# Patient Record
Sex: MALE | Race: WHITE | Employment: FULL TIME | URBAN - METROPOLITAN AREA
[De-identification: names, ages, dates, MRNs, and addresses within clinical notes are randomized per-mention and may not be internally consistent; named-entity substitution may affect disease eponyms.]

---

## 2020-07-17 ENCOUNTER — OFFICE VISIT (OUTPATIENT)
Dept: NEUROLOGY | Age: 55
End: 2020-07-17
Payer: COMMERCIAL

## 2020-07-17 VITALS
BODY MASS INDEX: 30.64 KG/M2 | HEIGHT: 70 IN | DIASTOLIC BLOOD PRESSURE: 105 MMHG | OXYGEN SATURATION: 95 % | WEIGHT: 214 LBS | SYSTOLIC BLOOD PRESSURE: 155 MMHG | HEART RATE: 93 BPM | RESPIRATION RATE: 16 BRPM | TEMPERATURE: 98.4 F

## 2020-07-17 PROCEDURE — 99204 OFFICE O/P NEW MOD 45 MIN: CPT | Performed by: PSYCHIATRY & NEUROLOGY

## 2020-07-17 RX ORDER — ALPRAZOLAM 0.5 MG/1
0.5 TABLET ORAL 3 TIMES DAILY PRN
COMMUNITY
Start: 2019-12-13

## 2020-07-17 RX ORDER — FUROSEMIDE 20 MG/1
TABLET ORAL
COMMUNITY
Start: 2020-07-10

## 2020-07-17 RX ORDER — FOLIC ACID 1 MG/1
1 TABLET ORAL DAILY
COMMUNITY
Start: 2019-12-13

## 2020-07-17 RX ORDER — METHION/INOS/CHOL BT/B COM/LIV 110MG-86MG
CAPSULE ORAL
COMMUNITY
Start: 2020-05-23

## 2020-07-17 RX ORDER — PANTOPRAZOLE SODIUM 40 MG/1
TABLET, DELAYED RELEASE ORAL
COMMUNITY
Start: 2019-03-01

## 2020-07-17 ASSESSMENT — ENCOUNTER SYMPTOMS
SHORTNESS OF BREATH: 0
PHOTOPHOBIA: 0
VOMITING: 0
NAUSEA: 0
TROUBLE SWALLOWING: 0

## 2020-07-17 NOTE — PROGRESS NOTES
NEUROLOGY NEW PATIENT NOTE     Date: 7/17/2020  Name: Swati Flores  MRN: <[id number]>  Patient's PCP: eTe West DO     Dear, Dr. Tee West DO    REASON FOR VISIT/CHIEF COMPLAINT: Dizziness     HISTORY OF PRESENT ILLNESS: Swati Flores is a 53 y.o.  male with past medical history of Mckeon's esophagus, tobacco abuse, EtOH abuse dizziness. The patient reports that he had an episode of dizziness in June where he was angry at his family, was being worked up, was anxious, was yelling prolonged pain after which she became out of breath, and became dizzy, sat down. He also started having some chest pain for which he was seen in the hospital and a work-up was done. He has implantable loop recorder. The patient reports that he had one similar episode in March 2020. Symptoms lasted for about 10 to 15 minutes. Resolved on its own, no tongue bite, no bowel or bladder incontinence, did not lose consciousness. Symptoms were moderate in severity. Aggravates with anxiety, agitation, anger and being worked up. Relieved with rest.  No other associated symptoms  Sleep is normal  Appetite is good  Mood is stable    I have personally reviewed his medical records. PAST MEDICAL HISTORY: History reviewed. No pertinent past medical history.   PAST SURGICAL HISTORY:   Past Surgical History:   Procedure Laterality Date    ECHO COMPL W DOP COLOR FLOW  9/9/2013          FAMILY MEDICAL HISTORY:   Family History   Problem Relation Age of Onset    Heart Disease Father         heart attack     SOCIAL HISTORY:   Social History     Socioeconomic History    Marital status:      Spouse name: None    Number of children: None    Years of education: None    Highest education level: None   Occupational History    None   Social Needs    Financial resource strain: None    Food insecurity     Worry: None     Inability: None    Transportation needs     Medical: None     Non-medical: None   Tobacco Use    Smoking status: Former Smoker     Packs/day: 3.00    Smokeless tobacco: Former User     Quit date: 9/9/2005   Substance and Sexual Activity    Alcohol use: Yes     Comment: occasionally     Drug use: Never    Sexual activity: None   Lifestyle    Physical activity     Days per week: None     Minutes per session: None    Stress: None   Relationships    Social connections     Talks on phone: None     Gets together: None     Attends [employer] service: None     Active member of club or organization: None     Attends meetings of clubs or organizations: None     Relationship status: None    Intimate partner violence     Fear of current or ex partner: None     Emotionally abused: None     Physically abused: None     Forced sexual activity: None   Other Topics Concern    None   Social History Narrative    None      E-Cigarettes Vaping or Juuling     Questions Responses    Vaping Use Never User    Start Date     Does device contain nicotine? Quit Date     Vaping Type          Allergy: No Known Allergies  MEDS:   Current Outpatient Medications:     pantoprazole (PROTONIX) 40 MG tablet, TAKE ONE TABLET BY MOUTH EVERY DAY BEFORE BREAKFAST, Disp: , Rfl:     furosemide (LASIX) 20 MG tablet, TAKE ONE TABLET BY MOUTH EVERY DAY, Disp: , Rfl:     Aspirin Buf,CaCarb-MgCarb-MgO, 81 MG TABS, Take 81 mg by mouth daily, Disp: , Rfl:     Thiamine HCl (B-1) 100 MG TABS, TAKE ONE TABLET BY MOUTH EVERY DAY, Disp: , Rfl:     folic acid (FOLVITE) 1 MG tablet, Take 1 mg by mouth daily, Disp: , Rfl:     ALPRAZolam (XANAX) 0.5 MG tablet, Take 0.5 mg by mouth 3 times daily as needed. , Disp: , Rfl:     REVIEW OF SYSTEMS  Review of Systems   Constitutional: Negative for appetite change, fatigue and unexpected weight change. HENT: Negative for drooling, hearing loss, tinnitus and trouble swallowing. Eyes: Negative for photophobia and visual disturbance. Respiratory: Negative for shortness of breath.     Cardiovascular: Negative for palpitations. Gastrointestinal: Negative for nausea and vomiting. Endocrine: Negative for polyuria. Genitourinary: Negative for flank pain. Musculoskeletal: Negative for neck pain and neck stiffness. Skin: Negative for rash. Allergic/Immunologic: Negative for food allergies. Neurological: Negative for dizziness, tremors, seizures, syncope, speech difficulty, weakness, light-headedness, numbness and headaches. Hematological: Negative for adenopathy. Psychiatric/Behavioral: Negative for agitation, behavioral problems and sleep disturbance. PHYSICAL EXAM:   BP (!) 155/105   Pulse 93   Temp 98.4 °F (36.9 °C) (Oral)   Resp 16   Ht 5' 10\" (1.778 m)   Wt 214 lb (97.1 kg)   SpO2 95%   BMI 30.71 kg/m²   GENERAL APPEARANCE: Alert, well-developed, well-nourished male in no acute distress. HEENT: Normocephalic and atraumatic. PERRL. Oropharynx unremarkable. PULM: Normal respiratory effort. No accessory muscle use. CV: RRR. ABDOMEN: Soft, nontender. EXTREMITIES: No obvious signs of vascular compromise. Pulses present. No cyanosis, clubbing or edema. SKIN: Clear; no rashes, lesions or skin breaks in exposed areas. NEURO:     Neurological examination     MENTAL STATUS: Patient awake and oriented to time, place, and person. Recent/remote memory normal. Attention span/concentration normal. Speech fluent. Good comprehension, naming, and repetition. Fund of knowledge appropriate for patient's level of education. Affect normal.    CRANIAL NERVES:  CN I: Not tested. CN II: Fundoscopic exam not performed. CN III, IV, VI: Pupils equal, round and reactive to light; extra ocular movements full and intact. CN V: Facial sensation normal.  CN VII: No facial asymmetry. CN VIII:  Hearing grossly normal bilaterally. No pathologic nystagmus or skew deviation. CN IX, X: Palate elevates symmetrically. CN XI: Shoulder shrug and chin rotation equal with intact strength.   CN XII: Tongue protrusion midline. MOTOR: Normal bulk. Tone normal and symmetric throughout. Strength 5/5 throughout. ABNORMAL MOVEMENTS/TREMORS: No     REFLEXES: DTRs 2+; normal and symmetric throughout. Plantar response downgoing. SENSATION: Sensation grossly intact to fine touch, pain/temperature, vibration and position. COORDINATION: Finger-to-nose and heel to shin normal for age and symmetric. Finger tapping and alternating movements normal.    STATION: Negative Romberg. GAIT:  Normal heel, toe and tandem; no ataxia. DIAGNOSTIC TESTS:     I have personally reviewed the most recent lab results:    Sodium   Date Value Ref Range Status   08/09/2011 138 132 - 146 mmol/L Final     Potassium   Date Value Ref Range Status   08/09/2011 4.2 3.5 - 5.5 mmol/L Final     Chloride   Date Value Ref Range Status   08/09/2011 105 99 - 109 mmol/L Final     CO2   Date Value Ref Range Status   08/09/2011 29 20 - 31 mmol/L Final     BUN   Date Value Ref Range Status   08/09/2011 9 6 - 20 mg/dL Final     Creatinine   Date Value Ref Range Status   08/09/2011 0.9 0.7 - 1.3 mg/dL Final     Gfr Calculated   Date Value Ref Range Status   08/09/2011 >60 >=60 ml/mn/1.73 Final     Comment:     Chronic Kidney Disease- less than 60 ml/min/1.73 sq.m. Kidney Failure- less than 15 ml/min/1.73 sq.m.      Calcium   Date Value Ref Range Status   08/09/2011 9.5 8.6 - 10.5 mg/dL Final     WBC   Date Value Ref Range Status   08/09/2011 5.9 4.5 - 11.5 E9/L Final     HGB   Date Value Ref Range Status   08/09/2011 13.7 12.5 - 16.5 g/dL Final     HCT   Date Value Ref Range Status   08/09/2011 39.6 37.0 - 54.0 % Final     Platelets   Date Value Ref Range Status   08/09/2011 246 130 - 450 E9/L Final     Monocytes   Date Value Ref Range Status   08/09/2011 9 2 - 12 % Final     Total Protein   Date Value Ref Range Status   08/09/2011 7.4 5.7 - 8.2 g/dL Final     Bilirubin, Total   Date Value Ref Range Status   08/09/2011 0.4 0.3 - 1.2 mg/dL Final     Alkaline Phosphatase   Date Value Ref Range Status   08/09/2011 48 45 - 129 U/L Final     ALT   Date Value Ref Range Status   08/09/2011 37 10 - 49 U/L Final     AST   Date Value Ref Range Status   08/09/2011 28 0 - 33 U/L Final     Lab Results   Component Value Date    INR 1.4 08/09/2011     No results found for: CHOLTOT, TRIG, HDL  No components found for: HGBA1C  No results found for: PROTEINCSF, GLUCCSF, WBCCSF    Controlled Substance Monitoring:    Acute and Chronic Pain Monitoring:   No flowsheet data found. MEDICAL DECISION MAKING  ASSESSMENT/PLAN    Ary Rutherford was seen today for dizziness. Diagnoses and all orders for this visit:    Syncope, unspecified syncope type    Dizziness      -     CT Head WO Contrast; Future  -     EEG awake or drowsy routine; Future    · Patient has been referred for neurological evaluation as he is a   · Check CT head to look for any acute intracranial normality. · EEG awake and drowsy state. · Etiology: I suspect dizziness secondary to underlying hyperventilation and anxiety playing a role in this. Patient has been cleared by cardiology. · Patient was counseled on smoking and EtOH cessation. Return if symptoms worsen or fail to improve. Thank you for involving me in the care of your patient. Today, I personally spent about 60 minutes directly face-to-face time with the patient, of which greater than 50% was spent in patient education, counseling,about etiology management and diagnosis of dizziness, anxiety, hyperventilation, seizures, seizure prevention strategies. Side effects of medications were discussed in detail with the patient, verbalizes understanding and agrees to it. And coordination of care as described above. Patient's current medication list, allergies, problem list and results of all previously ordered testing and scans were reviewed at today's visit.       Claudia Begum MD    [facility]  [address] [place] [address] [place], [place]

## 2020-07-20 ENCOUNTER — HOSPITAL ENCOUNTER (OUTPATIENT)
Dept: CT IMAGING | Age: 55
Discharge: HOME | End: 2020-07-22
Payer: COMMERCIAL

## 2020-07-20 ENCOUNTER — TELEPHONE (OUTPATIENT)
Dept: NEUROLOGY | Age: 55
End: 2020-07-20

## 2020-07-20 ENCOUNTER — HOSPITAL ENCOUNTER (OUTPATIENT)
Dept: NEUROLOGY | Age: 55
Discharge: HOME | End: 2020-07-20
Payer: COMMERCIAL

## 2020-07-20 PROCEDURE — 95816 EEG AWAKE AND DROWSY: CPT

## 2020-07-20 PROCEDURE — 95819 EEG AWAKE AND ASLEEP: CPT | Performed by: PSYCHIATRY & NEUROLOGY

## 2020-07-20 NOTE — PROCEDURES
electrographic or electroclinical seizures are recorded. CLINICAL INTERPRETATION:  This is a normal EEG recorded during wakefulness, drowsiness, and sleep.      Jing Chinchilla MD  Neurology & Clinical Neurophysiology    [facility]  [address] [facility],  [address] [place] [address], [id number]  Office phone: [phone]

## 2020-07-22 ENCOUNTER — TELEPHONE (OUTPATIENT)
Dept: NEUROLOGY | Age: 55
End: 2020-07-22

## 2020-07-22 NOTE — LETTER
[facility]  [address] [address] [address]  Phone: [phone]  Fax: [phone]    Akira Duncan MD        July 22, 2020     Patient: Massiel Preston   YOB: 1965   Date of Visit: 7/22/2020       To Whom It May Concern: It is my medical opinion that Andrez Cannon had a complete neurological evaluation in the clinic and may return to work with no restrictions. If you have any questions or concerns, please don't hesitate to call.     Sincerely,        Akira Duncan MD

## 2020-07-22 NOTE — TELEPHONE ENCOUNTER
Spoke with patient and informed him that his EEG is normal. Patient is requesting Dr. Piotr Low to write a statement stating there are no neurological deficients and he is okay to work. Please advise.

## 2021-06-12 ENCOUNTER — APPOINTMENT (OUTPATIENT)
Dept: CT IMAGING | Age: 56
End: 2021-06-12
Payer: COMMERCIAL

## 2021-06-12 ENCOUNTER — HOSPITAL ENCOUNTER (EMERGENCY)
Age: 56
Discharge: HOME | End: 2021-06-12
Attending: EMERGENCY MEDICINE
Payer: COMMERCIAL

## 2021-06-12 ENCOUNTER — APPOINTMENT (OUTPATIENT)
Dept: GENERAL RADIOLOGY | Age: 56
End: 2021-06-12
Payer: COMMERCIAL

## 2021-06-12 VITALS
DIASTOLIC BLOOD PRESSURE: 78 MMHG | SYSTOLIC BLOOD PRESSURE: 139 MMHG | HEIGHT: 70 IN | WEIGHT: 215 LBS | TEMPERATURE: 98.6 F | RESPIRATION RATE: 14 BRPM | HEART RATE: 78 BPM | OXYGEN SATURATION: 98 % | BODY MASS INDEX: 30.78 KG/M2

## 2021-06-12 DIAGNOSIS — F10.920 ACUTE ALCOHOLIC INTOXICATION WITHOUT COMPLICATION (HCC): ICD-10-CM

## 2021-06-12 DIAGNOSIS — S09.90XA INJURY OF HEAD, INITIAL ENCOUNTER: Primary | ICD-10-CM

## 2021-06-12 LAB
ACETAMINOPHEN LEVEL: <5 MCG/ML (ref 10–30)
ALBUMIN SERPL-MCNC: 4.4 G/DL (ref 3.5–5.2)
ALP BLD-CCNC: 57 U/L (ref 40–129)
ALT SERPL-CCNC: 50 U/L (ref 0–40)
AMPHETAMINE SCREEN, URINE: NOT DETECTED
ANION GAP SERPL CALCULATED.3IONS-SCNC: 14 MMOL/L (ref 7–16)
AST SERPL-CCNC: 31 U/L (ref 0–39)
BARBITURATE SCREEN URINE: NOT DETECTED
BASOPHILS ABSOLUTE: 0.09 E9/L (ref 0–0.2)
BASOPHILS RELATIVE PERCENT: 1.1 % (ref 0–2)
BENZODIAZEPINE SCREEN, URINE: NOT DETECTED
BILIRUB SERPL-MCNC: 0.4 MG/DL (ref 0–1.2)
BUN BLDV-MCNC: 13 MG/DL (ref 6–20)
CALCIUM SERPL-MCNC: 9.3 MG/DL (ref 8.6–10.2)
CANNABINOID SCREEN URINE: NOT DETECTED
CHLORIDE BLD-SCNC: 104 MMOL/L (ref 98–107)
CO2: 23 MMOL/L (ref 22–29)
COCAINE METABOLITE SCREEN URINE: NOT DETECTED
CREAT SERPL-MCNC: 1 MG/DL (ref 0.7–1.2)
EKG ATRIAL RATE: 72 BPM
EKG P AXIS: 18 DEGREES
EKG P-R INTERVAL: 150 MS
EKG Q-T INTERVAL: 422 MS
EKG QRS DURATION: 100 MS
EKG QTC CALCULATION (BAZETT): 462 MS
EKG R AXIS: 9 DEGREES
EKG T AXIS: 17 DEGREES
EKG VENTRICULAR RATE: 72 BPM
EOSINOPHILS ABSOLUTE: 0.43 E9/L (ref 0.05–0.5)
EOSINOPHILS RELATIVE PERCENT: 5.2 % (ref 0–6)
ETHANOL: 91 MG/DL (ref 0–0.08)
FENTANYL SCREEN, URINE: NOT DETECTED
GFR AFRICAN AMERICAN: >60
GFR NON-AFRICAN AMERICAN: >60 ML/MIN/1.73
GLUCOSE BLD-MCNC: 102 MG/DL (ref 74–99)
HCT VFR BLD CALC: 39.1 % (ref 37–54)
HEMOGLOBIN: 13.4 G/DL (ref 12.5–16.5)
IMMATURE GRANULOCYTES #: 0.13 E9/L
IMMATURE GRANULOCYTES %: 1.6 % (ref 0–5)
LYMPHOCYTES ABSOLUTE: 2.69 E9/L (ref 1.5–4)
LYMPHOCYTES RELATIVE PERCENT: 32.8 % (ref 20–42)
Lab: NORMAL
MAGNESIUM: 2.3 MG/DL (ref 1.6–2.6)
MCH RBC QN AUTO: 29.4 PG (ref 26–35)
MCHC RBC AUTO-ENTMCNC: 34.3 % (ref 32–34.5)
MCV RBC AUTO: 85.7 FL (ref 80–99.9)
METHADONE SCREEN, URINE: NOT DETECTED
MONOCYTES ABSOLUTE: 0.54 E9/L (ref 0.1–0.95)
MONOCYTES RELATIVE PERCENT: 6.6 % (ref 2–12)
NEUTROPHILS ABSOLUTE: 4.33 E9/L (ref 1.8–7.3)
NEUTROPHILS RELATIVE PERCENT: 52.7 % (ref 43–80)
OPIATE SCREEN URINE: NOT DETECTED
OXYCODONE URINE: NOT DETECTED
PDW BLD-RTO: 12.4 FL (ref 11.5–15)
PHENCYCLIDINE SCREEN URINE: NOT DETECTED
PLATELET # BLD: 285 E9/L (ref 130–450)
PMV BLD AUTO: 10.1 FL (ref 7–12)
POTASSIUM REFLEX MAGNESIUM: 3.4 MMOL/L (ref 3.5–5)
RBC # BLD: 4.56 E12/L (ref 3.8–5.8)
SALICYLATE, SERUM: <0.3 MG/DL (ref 0–30)
SODIUM BLD-SCNC: 141 MMOL/L (ref 132–146)
TOTAL PROTEIN: 7.1 G/DL (ref 6.4–8.3)
TRICYCLIC ANTIDEPRESSANTS SCREEN SERUM: NEGATIVE NG/ML
TROPONIN, HIGH SENSITIVITY: <6 NG/L (ref 0–11)
WBC # BLD: 8.2 E9/L (ref 4.5–11.5)

## 2021-06-12 PROCEDURE — 93005 ELECTROCARDIOGRAM TRACING: CPT | Performed by: STUDENT IN AN ORGANIZED HEALTH CARE EDUCATION/TRAINING PROGRAM

## 2021-06-12 PROCEDURE — 80307 DRUG TEST PRSMV CHEM ANLYZR: CPT

## 2021-06-12 PROCEDURE — 71045 X-RAY EXAM CHEST 1 VIEW: CPT

## 2021-06-12 PROCEDURE — 72125 CT NECK SPINE W/O DYE: CPT

## 2021-06-12 PROCEDURE — 99284 EMERGENCY DEPT VISIT MOD MDM: CPT

## 2021-06-12 PROCEDURE — 80179 DRUG ASSAY SALICYLATE: CPT

## 2021-06-12 PROCEDURE — 85025 COMPLETE CBC W/AUTO DIFF WBC: CPT

## 2021-06-12 PROCEDURE — 71275 CT ANGIOGRAPHY CHEST: CPT

## 2021-06-12 PROCEDURE — 82077 ASSAY SPEC XCP UR&BREATH IA: CPT

## 2021-06-12 PROCEDURE — 6360000004 HC RX CONTRAST MEDICATION: Performed by: STUDENT IN AN ORGANIZED HEALTH CARE EDUCATION/TRAINING PROGRAM

## 2021-06-12 PROCEDURE — 93010 ELECTROCARDIOGRAM REPORT: CPT | Performed by: INTERNAL MEDICINE

## 2021-06-12 PROCEDURE — 80143 DRUG ASSAY ACETAMINOPHEN: CPT

## 2021-06-12 PROCEDURE — 70450 CT HEAD/BRAIN W/O DYE: CPT

## 2021-06-12 PROCEDURE — 84484 ASSAY OF TROPONIN QUANT: CPT

## 2021-06-12 PROCEDURE — 80053 COMPREHEN METABOLIC PANEL: CPT

## 2021-06-12 PROCEDURE — 83735 ASSAY OF MAGNESIUM: CPT

## 2021-06-12 RX ADMIN — IOPAMIDOL 75 ML: 755 INJECTION, SOLUTION INTRAVENOUS at 06:41

## 2021-06-12 NOTE — ED NOTES
Message left with [place] to see if she would be available to  her father from the ER.      Maxwell Forrest RN  06/12/21 9629

## 2021-06-12 NOTE — ED PROVIDER NOTES
HPI:  6/12/21, Time: 12:19 AM EDT         Trygve Collet is a 63 y.o. male presenting to the ED for history of fall, beginning short time ago. The complaint has been persistent, moderate in severity, and worsened by nothing. Patient presenting here because of fall. Patient was confused at home. Patient unable to complete history was found in the garage by air compressor. Patient has history of seizures he also had been drinking. Patient has no reported chest pain or vomiting or there is no history of diarrhea. Patient unable to tell me exactly what happened at home. ROS:   Pertinent positives and negatives are stated within HPI, all other systems reviewed and are negative.  --------------------------------------------- PAST HISTORY ---------------------------------------------  Past Medical History:  has no past medical history on file. Past Surgical History:  has a past surgical history that includes ECHO Compl W Dop Color Flow (9/9/2013). Social History:  reports that he has quit smoking. He smoked 3.00 packs per day. He quit smokeless tobacco use about 15 years ago. He reports current alcohol use. He reports that he does not use drugs. Family History: family history includes Heart Disease in his father. The patients home medications have been reviewed. Allergies: Patient has no known allergies. ---------------------------------------------------PHYSICAL EXAM--------------------------------------    Constitutional/General: Alert and oriented x3, confused  Head: Normocephalic and atraumatic  Eyes: PERRL, EOMI  Mouth: Oropharynx clear, handling secretions, no trismus  Neck: c collar   Pulmonary: Lungs clear to auscultation bilaterally, no wheezes, rales, or rhonchi. Not in respiratory distress  Cardiovascular:  Regular rate. Regular rhythm. No murmurs, gallops, or rubs. 2+ distal pulses  Chest: no chest wall tenderness  Abdomen: Soft. Non tender. Non distended. +BS.   No rebound, guarding, or rigidity. No pulsatile masses appreciated. Musculoskeletal: Moves all extremities x 4. Warm and well perfused, no clubbing, cyanosis, or edema. Capillary refill <3 seconds  Skin: warm and dry. No rashes. Neurologic: GCS 15, CN 2-12 grossly intact, no focal deficits, symmetric strength 5/5 in the upper and lower extremities bilaterally  Psych: Normal Affect    -------------------------------------------------- RESULTS -------------------------------------------------  I have personally reviewed all laboratory and imaging results for this patient. Results are listed below.      LABS:  Results for orders placed or performed during the hospital encounter of 06/12/21   CBC Auto Differential   Result Value Ref Range    WBC 8.2 4.5 - 11.5 E9/L    RBC 4.56 3.80 - 5.80 E12/L    Hemoglobin 13.4 12.5 - 16.5 g/dL    Hematocrit 39.1 37.0 - 54.0 %    MCV 85.7 80.0 - 99.9 fL    MCH 29.4 26.0 - 35.0 pg    MCHC 34.3 32.0 - 34.5 %    RDW 12.4 11.5 - 15.0 fL    Platelets 154 731 - 618 E9/L    MPV 10.1 7.0 - 12.0 fL    Neutrophils % 52.7 43.0 - 80.0 %    Immature Granulocytes % 1.6 0.0 - 5.0 %    Lymphocytes % 32.8 20.0 - 42.0 %    Monocytes % 6.6 2.0 - 12.0 %    Eosinophils % 5.2 0.0 - 6.0 %    Basophils % 1.1 0.0 - 2.0 %    Neutrophils Absolute 4.33 1.80 - 7.30 E9/L    Immature Granulocytes # 0.13 E9/L    Lymphocytes Absolute 2.69 1.50 - 4.00 E9/L    Monocytes Absolute 0.54 0.10 - 0.95 E9/L    Eosinophils Absolute 0.43 0.05 - 0.50 E9/L    Basophils Absolute 0.09 0.00 - 0.20 E9/L   Comprehensive Metabolic Panel w/ Reflex to MG   Result Value Ref Range    Sodium 141 132 - 146 mmol/L    Potassium reflex Magnesium 3.4 (L) 3.5 - 5.0 mmol/L    Chloride 104 98 - 107 mmol/L    CO2 23 22 - 29 mmol/L    Anion Gap 14 7 - 16 mmol/L    Glucose 102 (H) 74 - 99 mg/dL    BUN 13 6 - 20 mg/dL    CREATININE 1.0 0.7 - 1.2 mg/dL    GFR Non-African American >60 >=60 mL/min/1.73    GFR African American >60     Calcium 9.3 8.6 - 10.2 mg/dL Total Protein 7.1 6.4 - 8.3 g/dL    Albumin 4.4 3.5 - 5.2 g/dL    Total Bilirubin 0.4 0.0 - 1.2 mg/dL    Alkaline Phosphatase 57 40 - 129 U/L    ALT 50 (H) 0 - 40 U/L    AST 31 0 - 39 U/L   Troponin   Result Value Ref Range    Troponin, High Sensitivity <6 0 - 11 ng/L   URINE DRUG SCREEN   Result Value Ref Range    Amphetamine Screen, Urine NOT DETECTED Negative <1000 ng/mL    Barbiturate Screen, Ur NOT DETECTED Negative < 200 ng/mL    Benzodiazepine Screen, Urine NOT DETECTED Negative < 200 ng/mL    Cannabinoid Scrn, Ur NOT DETECTED Negative < 50ng/mL    Cocaine Metabolite Screen, Urine NOT DETECTED Negative < 300 ng/mL    Opiate Scrn, Ur NOT DETECTED Negative < 300ng/mL    PCP Screen, Urine NOT DETECTED Negative < 25 ng/mL    Methadone Screen, Urine NOT DETECTED Negative <300 ng/mL    Oxycodone Urine NOT DETECTED Negative <100 ng/mL    FENTANYL SCREEN, URINE NOT DETECTED Negative <1 ng/mL    Drug Screen Comment: see below    Magnesium   Result Value Ref Range    Magnesium 2.3 1.6 - 2.6 mg/dL   Serum Drug Screen   Result Value Ref Range    Ethanol Lvl 91 mg/dL    Acetaminophen Level <5.0 (L) 10.0 - 55.3 mcg/mL    Salicylate, Serum <6.0 0.0 - 30.0 mg/dL    TCA Scrn NEGATIVE Cutoff:300 ng/mL   EKG 12 Lead   Result Value Ref Range    Ventricular Rate 72 BPM    Atrial Rate 72 BPM    P-R Interval 150 ms    QRS Duration 100 ms    Q-T Interval 422 ms    QTc Calculation (Bazett) 462 ms    P Axis 18 degrees    R Axis 9 degrees    T Axis 17 degrees       RADIOLOGY:  Interpreted by Radiologist.  CT Head WO Contrast   Final Result   No acute intracranial abnormality. Chronic appearing findings. MRI would be   useful if symptoms persist.         CT Cervical Spine WO Contrast   Final Result   No acute abnormality of the cervical spine. Degenerative changes. MRI would   be useful if symptoms persist.         CTA CHEST W CONTRAST   Final Result   No pulmonary embolism. Dependent atelectasis in the lung bases.       Minimal scarring or atelectasis in the lingula. Mild emphysematous changes. Moderate hiatal hernia. Findings are suggestive of asymmetric gynecomastia towards the left. Neoplastic process thought less likely. Correlate with clinical exam   findings. If indicated, this could be assessed with mammogram and or   ultrasound. XR CHEST PORTABLE   Final Result   Diminished lung volume with patchy left basilar opacities which could   represent atelectasis or developing infiltrates from pneumonia. PA and   lateral views would be useful for further assessment, if symptoms persist.             EKG:  This EKG is signed and interpreted by me. Rate: 72  Rhythm: Sinus  Interpretation: no acute changes  Comparison: no previous EKG available      ------------------------- NURSING NOTES AND VITALS REVIEWED ---------------------------   The nursing notes within the ED encounter and vital signs as below have been reviewed by myself. /78   Pulse 78   Temp 98.6 °F (37 °C)   Resp 14   Ht 5' 10\" (1.778 m)   Wt 215 lb (97.5 kg)   SpO2 98%   BMI 30.85 kg/m²   Oxygen Saturation Interpretation: Normal    The patients available past medical records and past encounters were reviewed. ------------------------------ ED COURSE/MEDICAL DECISION MAKING----------------------  Medications   iopamidol (ISOVUE-370) 76 % injection 75 mL (75 mLs Intravenous Given 6/12/21 0641)             Medical Decision Making:    Patient presented here because of fall. Patient unknown as to whether he did seizure. Patient had been drinking. Patient reporting no planes of chest pain or vomiting he reports no headache he reports no numbness or tingling labs noted reviewed EKG noted    Re-Evaluations:             Re-evaluation. Patients symptoms show no change  Patient reevaluated vital signs stable. Patient in no acute distress moving all extremities no numbness no tingling.     Consultations:                 Critical Care:         This patient's ED course included: a personal history and physicial eaxmination    This patient has been closely monitored during their ED course. Counseling: The emergency provider has spoken with the patient and discussed todays results, in addition to providing specific details for the plan of care and counseling regarding the diagnosis and prognosis. Questions are answered at this time and they are agreeable with the plan.       --------------------------------- IMPRESSION AND DISPOSITION ---------------------------------    IMPRESSION  1. Injury of head, initial encounter    2. Acute alcoholic intoxication without complication ([facility] [address]        DISPOSITION  Disposition: Disposition is dependent on CT findings  Patient condition is stable        NOTE: This report was transcribed using voice recognition software.  Every effort was made to ensure accuracy; however, inadvertent computerized transcription errors may be present          Live Mitchell MD  06/12/21 [address] [place] [address],[address] Parisa Hanley MD  06/12/21 2010

## 2021-06-12 NOTE — ED NOTES
Pt unable to contact of his wife at this time for a ride home. This RN also attempted to call wife but there was no answer and phone was turned off.       Nghia Walton RN  06/12/21 2968

## 2022-08-01 ENCOUNTER — APPOINTMENT (OUTPATIENT)
Dept: CT IMAGING | Age: 57
DRG: 897 | End: 2022-08-01
Payer: COMMERCIAL

## 2022-08-01 ENCOUNTER — APPOINTMENT (OUTPATIENT)
Dept: GENERAL RADIOLOGY | Age: 57
DRG: 897 | End: 2022-08-01
Payer: COMMERCIAL

## 2022-08-01 ENCOUNTER — HOSPITAL ENCOUNTER (INPATIENT)
Age: 57
LOS: 1 days | Discharge: HOME | DRG: 897 | End: 2022-08-02
Attending: EMERGENCY MEDICINE | Admitting: INTERNAL MEDICINE
Payer: COMMERCIAL

## 2022-08-01 DIAGNOSIS — R29.90 STROKE-LIKE SYMPTOMS: ICD-10-CM

## 2022-08-01 DIAGNOSIS — F10.920 ACUTE ALCOHOLIC INTOXICATION WITHOUT COMPLICATION (HCC): ICD-10-CM

## 2022-08-01 DIAGNOSIS — R55 SYNCOPE AND COLLAPSE: Primary | ICD-10-CM

## 2022-08-01 DIAGNOSIS — S09.90XA INJURY OF HEAD, INITIAL ENCOUNTER: ICD-10-CM

## 2022-08-01 LAB
ACETAMINOPHEN LEVEL: <5 MCG/ML (ref 10–30)
ANION GAP SERPL CALCULATED.3IONS-SCNC: 17 MMOL/L (ref 7–16)
APTT: 33.4 SEC (ref 24.5–35.1)
BASOPHILS ABSOLUTE: 0.04 E9/L (ref 0–0.2)
BASOPHILS RELATIVE PERCENT: 0.6 % (ref 0–2)
BUN BLDV-MCNC: 10 MG/DL (ref 6–20)
CALCIUM SERPL-MCNC: 8.7 MG/DL (ref 8.6–10.2)
CHLORIDE BLD-SCNC: 103 MMOL/L (ref 98–107)
CO2: 20 MMOL/L (ref 22–29)
CREAT SERPL-MCNC: 0.8 MG/DL (ref 0.7–1.2)
EOSINOPHILS ABSOLUTE: 0.26 E9/L (ref 0.05–0.5)
EOSINOPHILS RELATIVE PERCENT: 3.9 % (ref 0–6)
ETHANOL: 224 MG/DL (ref 0–0.08)
GFR AFRICAN AMERICAN: >60
GFR NON-AFRICAN AMERICAN: >60 ML/MIN/1.73
GLUCOSE BLD-MCNC: 87 MG/DL (ref 74–99)
HCT VFR BLD CALC: 34.3 % (ref 37–54)
HEMOGLOBIN: 11.7 G/DL (ref 12.5–16.5)
IMMATURE GRANULOCYTES #: 0.05 E9/L
IMMATURE GRANULOCYTES %: 0.7 % (ref 0–5)
INR BLD: 1.3
LYMPHOCYTES ABSOLUTE: 2.17 E9/L (ref 1.5–4)
LYMPHOCYTES RELATIVE PERCENT: 32.1 % (ref 20–42)
MCH RBC QN AUTO: 29.3 PG (ref 26–35)
MCHC RBC AUTO-ENTMCNC: 34.1 % (ref 32–34.5)
MCV RBC AUTO: 86 FL (ref 80–99.9)
MONOCYTES ABSOLUTE: 0.42 E9/L (ref 0.1–0.95)
MONOCYTES RELATIVE PERCENT: 6.2 % (ref 2–12)
NEUTROPHILS ABSOLUTE: 3.81 E9/L (ref 1.8–7.3)
NEUTROPHILS RELATIVE PERCENT: 56.5 % (ref 43–80)
PDW BLD-RTO: 13 FL (ref 11.5–15)
PLATELET # BLD: 191 E9/L (ref 130–450)
PMV BLD AUTO: 9.7 FL (ref 7–12)
POTASSIUM REFLEX MAGNESIUM: 3.6 MMOL/L (ref 3.5–5)
PROTHROMBIN TIME: 14.1 SEC (ref 9.3–12.4)
RBC # BLD: 3.99 E12/L (ref 3.8–5.8)
SALICYLATE, SERUM: <0.3 MG/DL (ref 0–30)
SODIUM BLD-SCNC: 140 MMOL/L (ref 132–146)
TRICYCLIC ANTIDEPRESSANTS SCREEN SERUM: NEGATIVE NG/ML
TROPONIN, HIGH SENSITIVITY: 7 NG/L (ref 0–11)
WBC # BLD: 6.8 E9/L (ref 4.5–11.5)

## 2022-08-01 PROCEDURE — 82077 ASSAY SPEC XCP UR&BREATH IA: CPT

## 2022-08-01 PROCEDURE — 80307 DRUG TEST PRSMV CHEM ANLYZR: CPT

## 2022-08-01 PROCEDURE — 2060000000 HC ICU INTERMEDIATE R&B

## 2022-08-01 PROCEDURE — 74177 CT ABD & PELVIS W/CONTRAST: CPT

## 2022-08-01 PROCEDURE — 85730 THROMBOPLASTIN TIME PARTIAL: CPT

## 2022-08-01 PROCEDURE — 80048 BASIC METABOLIC PNL TOTAL CA: CPT

## 2022-08-01 PROCEDURE — 87088 URINE BACTERIA CULTURE: CPT

## 2022-08-01 PROCEDURE — 72125 CT NECK SPINE W/O DYE: CPT

## 2022-08-01 PROCEDURE — 6360000004 HC RX CONTRAST MEDICATION: Performed by: RADIOLOGY

## 2022-08-01 PROCEDURE — 81001 URINALYSIS AUTO W/SCOPE: CPT

## 2022-08-01 PROCEDURE — 93005 ELECTROCARDIOGRAM TRACING: CPT | Performed by: EMERGENCY MEDICINE

## 2022-08-01 PROCEDURE — 71045 X-RAY EXAM CHEST 1 VIEW: CPT

## 2022-08-01 PROCEDURE — 80179 DRUG ASSAY SALICYLATE: CPT

## 2022-08-01 PROCEDURE — 80143 DRUG ASSAY ACETAMINOPHEN: CPT

## 2022-08-01 PROCEDURE — 85610 PROTHROMBIN TIME: CPT

## 2022-08-01 PROCEDURE — 84484 ASSAY OF TROPONIN QUANT: CPT

## 2022-08-01 PROCEDURE — 36415 COLL VENOUS BLD VENIPUNCTURE: CPT

## 2022-08-01 PROCEDURE — 99285 EMERGENCY DEPT VISIT HI MDM: CPT

## 2022-08-01 PROCEDURE — 71260 CT THORAX DX C+: CPT

## 2022-08-01 PROCEDURE — 70450 CT HEAD/BRAIN W/O DYE: CPT

## 2022-08-01 PROCEDURE — 85025 COMPLETE CBC W/AUTO DIFF WBC: CPT

## 2022-08-01 RX ORDER — ASPIRIN 81 MG/1
324 TABLET, CHEWABLE ORAL ONCE
Status: COMPLETED | OUTPATIENT
Start: 2022-08-01 | End: 2022-08-02

## 2022-08-01 RX ADMIN — IOPAMIDOL 75 ML: 755 INJECTION, SOLUTION INTRAVENOUS at 21:51

## 2022-08-01 ASSESSMENT — PAIN - FUNCTIONAL ASSESSMENT: PAIN_FUNCTIONAL_ASSESSMENT: NONE - DENIES PAIN

## 2022-08-01 NOTE — Clinical Note
Discharge Plan[de-identified] Other/[facility])   Telemetry/Cardiac Monitoring Required?: Yes

## 2022-08-02 ENCOUNTER — APPOINTMENT (OUTPATIENT)
Dept: MRI IMAGING | Age: 57
DRG: 897 | End: 2022-08-02
Payer: COMMERCIAL

## 2022-08-02 ENCOUNTER — APPOINTMENT (OUTPATIENT)
Dept: ULTRASOUND IMAGING | Age: 57
DRG: 897 | End: 2022-08-02
Payer: COMMERCIAL

## 2022-08-02 VITALS
DIASTOLIC BLOOD PRESSURE: 92 MMHG | WEIGHT: 250 LBS | RESPIRATION RATE: 18 BRPM | OXYGEN SATURATION: 97 % | HEART RATE: 77 BPM | HEIGHT: 70 IN | BODY MASS INDEX: 35.79 KG/M2 | SYSTOLIC BLOOD PRESSURE: 136 MMHG | TEMPERATURE: 97.5 F

## 2022-08-02 PROBLEM — R55 SYNCOPE: Status: ACTIVE | Noted: 2022-08-02

## 2022-08-02 LAB
ALBUMIN SERPL-MCNC: 4.3 G/DL (ref 3.5–5.2)
ALP BLD-CCNC: 57 U/L (ref 40–129)
ALT SERPL-CCNC: 39 U/L (ref 0–40)
AMPHETAMINE SCREEN, URINE: NOT DETECTED
ANION GAP SERPL CALCULATED.3IONS-SCNC: 13 MMOL/L (ref 7–16)
AST SERPL-CCNC: 36 U/L (ref 0–39)
BACTERIA: NORMAL /HPF
BARBITURATE SCREEN URINE: NOT DETECTED
BASOPHILS ABSOLUTE: 0.03 E9/L (ref 0–0.2)
BASOPHILS RELATIVE PERCENT: 0.7 % (ref 0–2)
BENZODIAZEPINE SCREEN, URINE: NOT DETECTED
BILIRUB SERPL-MCNC: 0.3 MG/DL (ref 0–1.2)
BILIRUBIN URINE: NEGATIVE
BLOOD, URINE: NEGATIVE
BUN BLDV-MCNC: 11 MG/DL (ref 6–20)
CALCIUM SERPL-MCNC: 8.9 MG/DL (ref 8.6–10.2)
CANNABINOID SCREEN URINE: NOT DETECTED
CHLORIDE BLD-SCNC: 109 MMOL/L (ref 98–107)
CLARITY: CLEAR
CO2: 22 MMOL/L (ref 22–29)
COCAINE METABOLITE SCREEN URINE: NOT DETECTED
COLOR: YELLOW
CREAT SERPL-MCNC: 0.8 MG/DL (ref 0.7–1.2)
EKG ATRIAL RATE: 58 BPM
EKG P AXIS: 46 DEGREES
EKG P-R INTERVAL: 152 MS
EKG Q-T INTERVAL: 430 MS
EKG QRS DURATION: 94 MS
EKG QTC CALCULATION (BAZETT): 422 MS
EKG R AXIS: 1 DEGREES
EKG T AXIS: 17 DEGREES
EKG VENTRICULAR RATE: 58 BPM
EOSINOPHILS ABSOLUTE: 0.23 E9/L (ref 0.05–0.5)
EOSINOPHILS RELATIVE PERCENT: 5.2 % (ref 0–6)
FENTANYL SCREEN, URINE: NOT DETECTED
GFR AFRICAN AMERICAN: >60
GFR NON-AFRICAN AMERICAN: >60 ML/MIN/1.73
GLUCOSE BLD-MCNC: 83 MG/DL (ref 74–99)
GLUCOSE URINE: NEGATIVE MG/DL
HCT VFR BLD CALC: 36 % (ref 37–54)
HEMOGLOBIN: 11.9 G/DL (ref 12.5–16.5)
IMMATURE GRANULOCYTES #: 0.03 E9/L
IMMATURE GRANULOCYTES %: 0.7 % (ref 0–5)
KETONES, URINE: NEGATIVE MG/DL
LEUKOCYTE ESTERASE, URINE: NEGATIVE
LV EF: 60 %
LVEF MODALITY: NORMAL
LYMPHOCYTES ABSOLUTE: 1.36 E9/L (ref 1.5–4)
LYMPHOCYTES RELATIVE PERCENT: 30.7 % (ref 20–42)
Lab: NORMAL
MCH RBC QN AUTO: 29 PG (ref 26–35)
MCHC RBC AUTO-ENTMCNC: 33.1 % (ref 32–34.5)
MCV RBC AUTO: 87.6 FL (ref 80–99.9)
METHADONE SCREEN, URINE: NOT DETECTED
MONOCYTES ABSOLUTE: 0.39 E9/L (ref 0.1–0.95)
MONOCYTES RELATIVE PERCENT: 8.8 % (ref 2–12)
NEUTROPHILS ABSOLUTE: 2.39 E9/L (ref 1.8–7.3)
NEUTROPHILS RELATIVE PERCENT: 53.9 % (ref 43–80)
NITRITE, URINE: NEGATIVE
OPIATE SCREEN URINE: NOT DETECTED
OXYCODONE URINE: NOT DETECTED
PDW BLD-RTO: 13.4 FL (ref 11.5–15)
PH UA: 6 (ref 5–9)
PHENCYCLIDINE SCREEN URINE: NOT DETECTED
PLATELET # BLD: 195 E9/L (ref 130–450)
PMV BLD AUTO: 9.6 FL (ref 7–12)
POTASSIUM REFLEX MAGNESIUM: 3.9 MMOL/L (ref 3.5–5)
PROTEIN UA: NEGATIVE MG/DL
RBC # BLD: 4.11 E12/L (ref 3.8–5.8)
RBC UA: NORMAL /HPF (ref 0–2)
SODIUM BLD-SCNC: 144 MMOL/L (ref 132–146)
SPECIFIC GRAVITY UA: <=1.005 (ref 1–1.03)
TOTAL PROTEIN: 7.2 G/DL (ref 6.4–8.3)
TROPONIN, HIGH SENSITIVITY: <6 NG/L (ref 0–11)
UROBILINOGEN, URINE: 0.2 E.U./DL
WBC # BLD: 4.4 E9/L (ref 4.5–11.5)
WBC UA: NORMAL /HPF (ref 0–5)

## 2022-08-02 PROCEDURE — 6360000002 HC RX W HCPCS: Performed by: PHYSICIAN ASSISTANT

## 2022-08-02 PROCEDURE — 93306 TTE W/DOPPLER COMPLETE: CPT

## 2022-08-02 PROCEDURE — 97165 OT EVAL LOW COMPLEX 30 MIN: CPT

## 2022-08-02 PROCEDURE — 84484 ASSAY OF TROPONIN QUANT: CPT

## 2022-08-02 PROCEDURE — 6370000000 HC RX 637 (ALT 250 FOR IP): Performed by: EMERGENCY MEDICINE

## 2022-08-02 PROCEDURE — 93880 EXTRACRANIAL BILAT STUDY: CPT

## 2022-08-02 PROCEDURE — 6370000000 HC RX 637 (ALT 250 FOR IP): Performed by: PHYSICIAN ASSISTANT

## 2022-08-02 PROCEDURE — 85025 COMPLETE CBC W/AUTO DIFF WBC: CPT

## 2022-08-02 PROCEDURE — 97161 PT EVAL LOW COMPLEX 20 MIN: CPT

## 2022-08-02 PROCEDURE — 93880 EXTRACRANIAL BILAT STUDY: CPT | Performed by: RADIOLOGY

## 2022-08-02 PROCEDURE — 6370000000 HC RX 637 (ALT 250 FOR IP): Performed by: PSYCHIATRY & NEUROLOGY

## 2022-08-02 PROCEDURE — 70551 MRI BRAIN STEM W/O DYE: CPT

## 2022-08-02 PROCEDURE — 80053 COMPREHEN METABOLIC PANEL: CPT

## 2022-08-02 RX ORDER — PROMETHAZINE HYDROCHLORIDE 12.5 MG/1
12.5 TABLET ORAL EVERY 6 HOURS PRN
Status: DISCONTINUED | OUTPATIENT
Start: 2022-08-02 | End: 2022-08-02 | Stop reason: HOSPADM

## 2022-08-02 RX ORDER — ATORVASTATIN CALCIUM 40 MG/1
TABLET, FILM COATED ORAL
COMMUNITY
Start: 2021-12-21

## 2022-08-02 RX ORDER — SODIUM CHLORIDE 0.9 % (FLUSH) 0.9 %
5-40 SYRINGE (ML) INJECTION PRN
Status: DISCONTINUED | OUTPATIENT
Start: 2022-08-02 | End: 2022-08-02 | Stop reason: HOSPADM

## 2022-08-02 RX ORDER — ATORVASTATIN CALCIUM 40 MG/1
40 TABLET, FILM COATED ORAL NIGHTLY
Status: DISCONTINUED | OUTPATIENT
Start: 2022-08-02 | End: 2022-08-02 | Stop reason: HOSPADM

## 2022-08-02 RX ORDER — ONDANSETRON 2 MG/ML
4 INJECTION INTRAMUSCULAR; INTRAVENOUS EVERY 6 HOURS PRN
Status: DISCONTINUED | OUTPATIENT
Start: 2022-08-02 | End: 2022-08-02 | Stop reason: HOSPADM

## 2022-08-02 RX ORDER — SODIUM CHLORIDE 9 MG/ML
25 INJECTION, SOLUTION INTRAVENOUS PRN
Status: DISCONTINUED | OUTPATIENT
Start: 2022-08-02 | End: 2022-08-02 | Stop reason: HOSPADM

## 2022-08-02 RX ORDER — DIVALPROEX SODIUM 250 MG/1
250 TABLET, DELAYED RELEASE ORAL EVERY MORNING
Status: DISCONTINUED | OUTPATIENT
Start: 2022-08-03 | End: 2022-08-02 | Stop reason: HOSPADM

## 2022-08-02 RX ORDER — PANTOPRAZOLE SODIUM 40 MG/1
40 TABLET, DELAYED RELEASE ORAL
Status: DISCONTINUED | OUTPATIENT
Start: 2022-08-03 | End: 2022-08-02 | Stop reason: HOSPADM

## 2022-08-02 RX ORDER — TELMISARTAN 20 MG/1
TABLET ORAL
COMMUNITY
Start: 2022-07-20

## 2022-08-02 RX ORDER — POLYETHYLENE GLYCOL 3350 17 G/17G
17 POWDER, FOR SOLUTION ORAL DAILY PRN
Status: DISCONTINUED | OUTPATIENT
Start: 2022-08-02 | End: 2022-08-02 | Stop reason: HOSPADM

## 2022-08-02 RX ORDER — ACETAMINOPHEN 325 MG/1
650 TABLET ORAL EVERY 6 HOURS PRN
Status: DISCONTINUED | OUTPATIENT
Start: 2022-08-02 | End: 2022-08-02 | Stop reason: HOSPADM

## 2022-08-02 RX ORDER — ACETAMINOPHEN 650 MG/1
650 SUPPOSITORY RECTAL EVERY 6 HOURS PRN
Status: DISCONTINUED | OUTPATIENT
Start: 2022-08-02 | End: 2022-08-02 | Stop reason: HOSPADM

## 2022-08-02 RX ORDER — ENOXAPARIN SODIUM 100 MG/ML
30 INJECTION SUBCUTANEOUS 2 TIMES DAILY
Status: DISCONTINUED | OUTPATIENT
Start: 2022-08-02 | End: 2022-08-02 | Stop reason: HOSPADM

## 2022-08-02 RX ORDER — FUROSEMIDE 20 MG/1
20 TABLET ORAL DAILY
Status: DISCONTINUED | OUTPATIENT
Start: 2022-08-02 | End: 2022-08-02 | Stop reason: HOSPADM

## 2022-08-02 RX ORDER — DIVALPROEX SODIUM 125 MG/1
TABLET, DELAYED RELEASE ORAL
COMMUNITY
Start: 2022-07-11

## 2022-08-02 RX ORDER — SODIUM CHLORIDE 0.9 % (FLUSH) 0.9 %
5-40 SYRINGE (ML) INJECTION EVERY 12 HOURS SCHEDULED
Status: DISCONTINUED | OUTPATIENT
Start: 2022-08-02 | End: 2022-08-02 | Stop reason: HOSPADM

## 2022-08-02 RX ORDER — DIVALPROEX SODIUM 125 MG/1
375 TABLET, DELAYED RELEASE ORAL NIGHTLY
Status: DISCONTINUED | OUTPATIENT
Start: 2022-08-02 | End: 2022-08-02 | Stop reason: HOSPADM

## 2022-08-02 RX ORDER — LOSARTAN POTASSIUM 50 MG/1
25 TABLET ORAL DAILY
Status: DISCONTINUED | OUTPATIENT
Start: 2022-08-02 | End: 2022-08-02 | Stop reason: HOSPADM

## 2022-08-02 RX ADMIN — ASPIRIN 81 MG CHEWABLE TABLET 324 MG: 81 TABLET CHEWABLE at 00:49

## 2022-08-02 RX ADMIN — ENOXAPARIN SODIUM 30 MG: 100 INJECTION SUBCUTANEOUS at 05:54

## 2022-08-02 RX ADMIN — FUROSEMIDE 20 MG: 20 TABLET ORAL at 11:03

## 2022-08-02 RX ADMIN — LOSARTAN POTASSIUM 25 MG: 50 TABLET, FILM COATED ORAL at 13:48

## 2022-08-02 RX ADMIN — METOPROLOL TARTRATE 25 MG: 25 TABLET, FILM COATED ORAL at 13:48

## 2022-08-02 ASSESSMENT — LIFESTYLE VARIABLES
HOW MANY STANDARD DRINKS CONTAINING ALCOHOL DO YOU HAVE ON A TYPICAL DAY: 10 OR MORE
HOW OFTEN DO YOU HAVE A DRINK CONTAINING ALCOHOL: 4 OR MORE TIMES A WEEK

## 2022-08-02 NOTE — ED PROVIDER NOTES
HPI:  8/1/22,   Time: 9:30 PM EDT       Ana Gerard is a 61 y.o. male presenting to the ED for fall, beginning earlier today ago. The complaint has been intermittent, moderate in severity, and worsened by nothing. Patient states he has a history of a CVA in November. He states that his left-sided weakness from his previous stroke has improved but over the last 2 weeks he began having increasing weakness on the left side again. He states he did not tell his family at that time. He states that today he was outside and was drinking. He states that he saw black spots and next thing he knew he woke up on the concrete. He states that he had a hard time getting up but eventually he was able to get up and when he got into the house he tripped falling forward and fell down 10 steps into his basement. Unknown LOC. Patient denies any chest pain or shortness of breath. No abdominal pain. No nausea or vomiting. Does complain of left-sided weakness as well as left-sided numbness that has been present for 2 weeks. Review of Systems:   Pertinent positives and negatives are stated within HPI, all other systems reviewed and are negative.          --------------------------------------------- PAST HISTORY ---------------------------------------------  Past Medical History:  has no past medical history on file. Past Surgical History:  has a past surgical history that includes ECHO Compl W Dop Color Flow (9/9/2013). Social History:  reports that he has quit smoking. He smoked an average of 3 packs per day. He quit smokeless tobacco use about 16 years ago. He reports current alcohol use. He reports that he does not use drugs. Family History: family history includes Heart Disease in his father. The patients home medications have been reviewed. Allergies: Patient has no known allergies.         ---------------------------------------------------PHYSICAL EXAM--------------------------------------    Constitutional/General: Alert and oriented x3, well appearing, non toxic in NAD  Head: Normocephalic and atraumatic  Eyes: PERRL, EOMI, conjunctive normal, sclera non icteric  Mouth: Oropharynx clear, handling secretions, no trismus, no asymmetry of the posterior oropharynx or uvular edema  Neck: C-collar in place, non tender to palpation in the midline, no stridor, no crepitus, no meningeal signs  Respiratory: Lungs clear to auscultation bilaterally, no wheezes, rales, or rhonchi. Not in respiratory distress  Cardiovascular: Bradycardic regular rhythm. No murmurs, gallops, or rubs. 2+ distal pulses  GI:  Abdomen Soft, Non tender, Non distended. +BS. No organomegaly, no palpable masses,  No rebound, guarding, or rigidity. Musculoskeletal: Moves all extremities x 4. Warm and well perfused, no clubbing, cyanosis, or edema. Capillary refill <3 seconds. Pelvis stable. No midline thoracic or lumbar tenderness. Integument: skin warm and dry. No rashes. Lymphatic: no lymphadenopathy noted  Neurologic: GCS 15, 4 out of 5 muscle strength to left upper extremity and left lower extremity. 5 out of 5 muscle strength to right upper extremity and right lower extremity. No obvious facial droop. Decreased sensation to left upper arm and left lower extremity. Mild dysarthria. Patient states symptoms of been present for 2 weeks. Psychiatric: Normal Affect    -------------------------------------------------- RESULTS -------------------------------------------------  I have personally reviewed all laboratory and imaging results for this patient. Results are listed below.      LABS:  Results for orders placed or performed during the hospital encounter of 08/01/22   CBC with Auto Differential   Result Value Ref Range    WBC 6.8 4.5 - 11.5 E9/L    RBC 3.99 3.80 - 5.80 E12/L    Hemoglobin 11.7 (L) 12.5 - 16.5 g/dL    Hematocrit 34.3 (L) 37.0 - 54.0 %    MCV 86.0 80.0 - 99.9 fL MCH 29.3 26.0 - 35.0 pg    MCHC 34.1 32.0 - 34.5 %    RDW 13.0 11.5 - 15.0 fL    Platelets 700 582 - 647 E9/L    MPV 9.7 7.0 - 12.0 fL    Neutrophils % 56.5 43.0 - 80.0 %    Immature Granulocytes % 0.7 0.0 - 5.0 %    Lymphocytes % 32.1 20.0 - 42.0 %    Monocytes % 6.2 2.0 - 12.0 %    Eosinophils % 3.9 0.0 - 6.0 %    Basophils % 0.6 0.0 - 2.0 %    Neutrophils Absolute 3.81 1.80 - 7.30 E9/L    Immature Granulocytes # 0.05 E9/L    Lymphocytes Absolute 2.17 1.50 - 4.00 E9/L    Monocytes Absolute 0.42 0.10 - 0.95 E9/L    Eosinophils Absolute 0.26 0.05 - 0.50 E9/L    Basophils Absolute 0.04 0.00 - 0.20 M5/K   Basic Metabolic Panel w/ Reflex to MG   Result Value Ref Range    Sodium 140 132 - 146 mmol/L    Potassium reflex Magnesium 3.6 3.5 - 5.0 mmol/L    Chloride 103 98 - 107 mmol/L    CO2 20 (L) 22 - 29 mmol/L    Anion Gap 17 (H) 7 - 16 mmol/L    Glucose 87 74 - 99 mg/dL    BUN 10 6 - 20 mg/dL    Creatinine 0.8 0.7 - 1.2 mg/dL    GFR Non-African American >60 >=60 mL/min/1.73    GFR African American >60     Calcium 8.7 8.6 - 10.2 mg/dL   Troponin   Result Value Ref Range    Troponin, High Sensitivity 7 0 - 11 ng/L   Protime-INR   Result Value Ref Range    Protime 14.1 (H) 9.3 - 12.4 sec    INR 1.3    APTT   Result Value Ref Range    aPTT 33.4 24.5 - 35.1 sec   Serum Drug Screen   Result Value Ref Range    Ethanol Lvl 224 mg/dL    Acetaminophen Level <5.0 (L) 10.0 - 34.7 mcg/mL    Salicylate, Serum <8.4 0.0 - 30.0 mg/dL    TCA Scrn NEGATIVE Cutoff:300 ng/mL       RADIOLOGY:  Interpreted by Radiologist.  CT Head WO Contrast   Final Result   No CT evidence of an acute intracranial abnormality. No evidence of acute fracture or traumatic malalignment of the cervical spine. CT Cervical Spine WO Contrast   Final Result   No acute abnormality of the cervical spine. CT ABDOMEN PELVIS W IV CONTRAST Additional Contrast? None   Final Result   CT OF THE CHEST:      1.  No traumatic or other acute abnormality is seen in the chest.   2. Small hiatal hernia. 3. Mild left gynecomastia. CT OF THE ABDOMEN AND PELVIS:      1. No traumatic or other acute abnormality seen in the abdomen or the pelvis. 2. Moderate distal colonic diverticulosis without diverticulitis. CT CHEST W CONTRAST   Final Result   CT OF THE CHEST:      1. No traumatic or other acute abnormality is seen in the chest.   2. Small hiatal hernia. 3. Mild left gynecomastia. CT OF THE ABDOMEN AND PELVIS:      1. No traumatic or other acute abnormality seen in the abdomen or the pelvis. 2. Moderate distal colonic diverticulosis without diverticulitis. XR CHEST PORTABLE    (Results Pending)       EKG: This EKG is signed and interpreted by the EP. EG shows sinus bradycardia 58 bpm.  Normal axis. Normal QRS. No STEMI.    ------------------------- NURSING NOTES AND VITALS REVIEWED ---------------------------   The nursing notes within the ED encounter and vital signs as below have been reviewed by myself. /71   Pulse 61   Temp 97.8 °F (36.6 °C) (Oral)   Resp 18   Ht 5' 10\" (1.778 m)   Wt 250 lb (113.4 kg)   SpO2 95%   BMI 35.87 kg/m²   Oxygen Saturation Interpretation: Normal    The patients available past medical records and past encounters were reviewed. ------------------------------ ED COURSE/MEDICAL DECISION MAKING----------------------  Medications   aspirin chewable tablet 324 mg (has no administration in time range)   iopamidol (ISOVUE-370) 76 % injection 75 mL (75 mLs IntraVENous Given 8/1/22 3736)         ED COURSE:       Medical Decision Making: This is a 77-year-old Evangelina Mowers male who presented to the ED for syncope fall and left-sided weakness that has been present for 2 weeks. Patient underwent laboratory work-up as well as CT imaging. Laboratory work-up unremarkable except for a hemoglobin 11.7. BUN and creatinine unremarkable but bicarb was 20 and anion gap 17. Troponin negative.   EKG showed nonspecific findings. Troponin was normal.  Alcohol level was 224. Pan scan CT shows no traumatic injuries. Hiatal hernia gynecomastia as well as diverticulosis noted. Patient made aware of incidental findings. Patient's left-sided weakness has been present for 2 weeks therefore patient outside the window for tPA or any intervention. Due to the patient strokelike symptoms as well as multiple episodes of syncope the patient be admitted for further care. Case discussed with [facility] who is excepted patient for admission. I, Dr. Olga Rodriguez, am the primary provider for this encounter    This patient's ED course included: a personal history and physicial examination, re-evaluation prior to disposition, multiple bedside re-evaluations, IV medications, cardiac monitoring, and continuous pulse oximetry    This patient has remained hemodynamically stable during their ED course. Re-Evaluations:             Re-evaluation. Patients symptoms show no change      Counseling: The emergency provider has spoken with the patient and discussed todays results, in addition to providing specific details for the plan of care and counseling regarding the diagnosis and prognosis. Questions are answered at this time and they are agreeable with the plan.       --------------------------------- IMPRESSION AND DISPOSITION ---------------------------------    IMPRESSION  1. Syncope and collapse    2. Injury of head, initial encounter    3. Stroke-like symptoms    4. Acute alcoholic intoxication without complication ([facility] [address]        DISPOSITION  Disposition: Admit to telemetry  Patient condition is stable    NOTE: This report was transcribed using voice recognition software.  Every effort was made to ensure accuracy; however, inadvertent computerized transcription errors may be present        Carmine Dominguez DO  08/01/22 2531

## 2022-08-02 NOTE — CONSULTS
NEUROLOGY CONSULT NOTE       Requesting Physician: Sloan English MD / PREM Beard    Reason for Consult:  Evaluate for \"strokelike symptoms\"      IMPRESSION:  Mild, chronic, left hemiparesis with some embellishing of symptoms. Past history of a right parietal stroke in November 2021. Suspect he does have some fluctuations of this when tired, dehydrated, drinking alcohol, etc.  Doubt TIAs. RECOMMENDATIONS:  Continue Xarelto for stroke prevention in this patient who has a history of atrial flutter. I have resumed his antihypertensives (with our substitution for Micardis) and would continue to monitor for significant orthostatic hypotension. No other evaluation or treatment needed from a neurology standpoint and we will sign off. History of Present Illness:  Harry Park is a 61 y.o. male admitted to [facility] on 8/1/2022. He had been drinking and has some fluttering across his vision and lost consciousness. Alcohol level was measured to 224. In the emergency room he noted that he been having an increase in his chronic left hemiparesis for the last couple of weeks. He had had a stroke diagnosed in November 2021 after he presented with a couple of weeks of paresthesias and weakness on the left. Seen by Dr. Mancuso/neurology of [facility], and his very good office records were reviewed. An MRI scan without stroke showed it to be a small right parieto-occipital infarction. He was given aspirin and Lipitor and initially Plavix but then a recorder demonstrated atrial fibrillation he was put on Xarelto. Since then he has had trouble with continued left-sided weakness, graded MRC 4+, repeating himself, being somnolent, having dysphagia, and having headaches. He was a  and put on disability. Prior to this he had had a long history of staring spells which have been going on for years. An EEG was normal.  He had been having anger outbursts and was put on valproic acid. Neuropsychological testing gave him multiple diagnoses including PTSD, ADHD, bipolar 1, borderline personality, and major vascular neurocognitive disorder. Past Medical History:    History reviewed. No pertinent past medical history.         Procedure Laterality Date    ECHO COMPL W DOP COLOR FLOW  9/9/2013        Ischemic stroke, atrial flutter, hypertension, possible psychiatric diagnoses as noted above, diagnosis of \"major vascular neurocognitive disorder\"    Social History:  Social History     Tobacco Use   Smoking Status Former    Packs/day: 3.00    Types: Cigarettes   Smokeless Tobacco Former    Quit date: 9/9/2005     Social History     Substance and Sexual Activity   Alcohol Use Yes    Comment: occasionally      Social History     Substance and Sexual Activity   Drug Use Never         Family History:       Problem Relation Age of Onset    Heart Disease Father         heart attack       Review of Systems:  CONSTITUTIONAL:  negative for fever or recent illness  EYE: See HPI  ENT: Having trouble hearing me  RESPIRATORY:  negative for dyspnea  CARDIOVASCULAR:  negative for chest pain  GASTROINTESTINAL:  negative for nausea  HEMATOLOGIC/LYMPHATIC: Anticoagulated  MUSCULOSKELETAL: Arthralgias  BEHAVIOR/PSYCH: See HPI  NEUROLOGIC:  see HPI; also wakes up with numbness of the first few fingers on his left hand    Allergies:    No Known Allergies     Current Medications:   furosemide (LASIX) tablet 20 mg, Daily  [START ON 8/3/2022] pantoprazole (PROTONIX) tablet 40 mg, QAM AC  sodium chloride flush 0.9 % injection 5-40 mL, 2 times per day  sodium chloride flush 0.9 % injection 5-40 mL, PRN  0.9 % sodium chloride infusion, PRN  enoxaparin Sodium (LOVENOX) injection 30 mg, BID  promethazine (PHENERGAN) tablet 12.5 mg, Q6H PRN   Or  ondansetron (ZOFRAN) injection 4 mg, Q6H PRN  polyethylene glycol (GLYCOLAX) packet 17 g, Daily PRN  acetaminophen (TYLENOL) tablet 650 mg, Q6H PRN   Or  acetaminophen (TYLENOL) suppository 650 mg, Q6H PRN  perflutren lipid microspheres (DEFINITY) injection 1.65 mg, ONCE PRN  metoprolol tartrate (LOPRESSOR) tablet 25 mg, BID  losartan (COZAAR) tablet 25 mg, Daily       Medications Prior to Admission: metoprolol tartrate (LOPRESSOR) 25 MG tablet, metoprolol tartrate 25 mg tablet  Take 1 tablet twice a day by oral route. atorvastatin (LIPITOR) 40 MG tablet, atorvastatin 40 mg tablet  Take 1 tablet every day by oral route. rivaroxaban (XARELTO) 20 MG TABS tablet, Xarelto 20 mg tablet  Take 1 tablet every day by oral route. telmisartan (MICARDIS) 20 MG tablet, TAKE ONE TABLET BY MOUTH DAILY  divalproex (DEPAKOTE) 125 MG DR tablet, TAKE 2 TABLETS (250MG) BY MOUTH IN THE MORNING AND TAKE 3 TABLETS (375MG) AT BEDTIME  pantoprazole (PROTONIX) 40 MG tablet, TAKE ONE TABLET BY MOUTH EVERY DAY BEFORE BREAKFAST  furosemide (LASIX) 20 MG tablet, TAKE ONE TABLET BY MOUTH EVERY DAY  Aspirin Buf,CaCarb-MgCarb-MgO, 81 MG TABS, Take 81 mg by mouth daily (Patient not taking: No sig reported)  Thiamine HCl (B-1) 100 MG TABS, TAKE ONE TABLET BY MOUTH EVERY DAY  folic acid (FOLVITE) 1 MG tablet, Take 1 mg by mouth daily (Patient not taking: Reported on 8/2/2022)  ALPRAZolam (XANAX) 0.5 MG tablet, Take 0.5 mg by mouth 3 times daily as needed. Physical Exam:  BP (!) 131/96   Pulse 66   Temp 97 °F (36.1 °C) (Temporal)   Resp 15   Ht 5' 10\" (1.778 m)   Wt 250 lb (113.4 kg)   SpO2 97%   BMI 35.87 kg/m²  I Body mass index is 35.87 kg/m². I   Wt Readings from Last 1 Encounters:   08/01/22 250 lb (113.4 kg)        Nurse measured orthostatic blood pressure seated 171/91 with pulse of 64 to standing 148/84 with pulse of 70    GENERAL: he is in no apparent distress, and appears stated age   EYE:  Fundi not examined due to the Covid-19 outbreak  CARDIOVASCULAR:  Heart regular rate and rhythm. No carotid bruits detected.   NEUROLOGIC:  Level of Alertness: alert  Orientation: oriented to person, place and time  Memory and Fund of Knowledge: Mildly forgetful of things like what certain medications are for  Attention/Concentration: normal  Language: Occasional mild hesitancy of speech but nothing outside the limits of normal  Cranial Nerves: pupils are equal; extraocular muscles intact; facial strength and sensation are intact; hearing is intact to soft voice; the palate raises midline, and the tongue protrudes midline; shoulder shrug is symmetric  Motor Exam: Normal tone in all extremities. Very minimal pronator drift on the left. Strength is MRC grade 5 right. In the left upper extremity MRC grade 4+. In the left lower extremity there was some giveaway weakness with a positive Sorensen sign.   Sensory: Sensory symmetric to light touch  Coordination: Cerebellar function is intact for the nose-finger-nose maneuver; rapid alternating movements were minimally slower on the left  Deep Tendon Reflexes: +1/4 where evoked  Station and gait: Unremarkable    Diagnostics:  CBC:   Lab Results   Component Value Date/Time    WBC 4.4 08/02/2022 05:29 AM    RBC 4.11 08/02/2022 05:29 AM    HGB 11.9 08/02/2022 05:29 AM    HCT 36.0 08/02/2022 05:29 AM    MCV 87.6 08/02/2022 05:29 AM    MCH 29.0 08/02/2022 05:29 AM    MCHC 33.1 08/02/2022 05:29 AM    RDW 13.4 08/02/2022 05:29 AM     08/02/2022 05:29 AM    MPV 9.6 08/02/2022 05:29 AM     CMP:    Lab Results   Component Value Date/Time     08/02/2022 05:29 AM    K 3.9 08/02/2022 05:29 AM     08/02/2022 05:29 AM    CO2 22 08/02/2022 05:29 AM    BUN 11 08/02/2022 05:29 AM    CREATININE 0.8 08/02/2022 05:29 AM    GFRAA >60 08/02/2022 05:29 AM    LABGLOM >60 08/02/2022 05:29 AM    GLUCOSE 83 08/02/2022 05:29 AM    GLUCOSE 95 08/09/2011 02:43 AM    PROT 7.2 08/02/2022 05:29 AM    LABALBU 4.3 08/02/2022 05:29 AM    LABALBU 4.5 08/09/2011 02:43 AM    CALCIUM 8.9 08/02/2022 05:29 AM    BILITOT 0.3 08/02/2022 05:29 AM    ALKPHOS 57 08/02/2022 05:29 AM    AST 36 08/02/2022 05:29 AM    ALT 39 08/02/2022 05:29 AM     PT/INR:    Lab Results   Component Value Date/Time    PROTIME 14.1 08/01/2022 09:30 PM    PROTIME 14.6 08/09/2011 02:43 AM    INR 1.3 08/01/2022 09:30 PM       MRI brain images reviewed: Small area of encephalomalacia in the right parietal lobe consistent with an old cortical infarction      Electronically signed by Juana Zafar DO on 8/2/2022 at 12:22 PM

## 2022-08-02 NOTE — FLOWSHEET NOTE
08/02/22 1603   Vital Signs   Orthostatic B/P and Pulse?  Yes   Blood Pressure Lying 126/74   Pulse Lying 63 PER MINUTE   Blood Pressure Sitting 145/85   Pulse Sitting 70 PER MINUTE   Blood Pressure Standing 141/85   Pulse Standing 70 PER MINUTE

## 2022-08-02 NOTE — ED NOTES
Radiology Procedure Waiver   Name: Candida Knapp  : 1965  MRN: [id number]    Date:  22    Time: 9:39 PM EDT    Benefits of immediately proceeding with Radiology exam(s) without pre-testing outweigh the risks or are not indicated as specified below and therefore the following is/are being waived:    [] Pregnancy test   [] Patients LMP on-time and regular.   [] Patient had Tubal Ligation or has other Contraception Device. [] Patient  is Menopausal or Premenarcheal.    [] Patient had Full or Partial Hysterectomy. [] Protocol for Iodine allergy    [] MRI Questionnaire     [x] BUN/Creatinine   [] Patient age w/no hx of renal dysfunction. [] Patient on Dialysis. [] Recent Normal Labs.   Electronically signed by Rebeca Kimble DO on 22 at 9:39 PM EDT               Rebeca Kimble DO  22 [id number]

## 2022-08-02 NOTE — CARE COORDINATION
Per notes-Past history of a right parietal stroke in November 2021 Sadia Sears had been drinking and has some fluttering across his vision and lost consciousness. Neurology consult. ct head -negative. Met with patient to discuss role/transition of care. He lives in 1 story home with ramp to enter with his wife,son,sons girlfriend and granddaughter. His PCP is Dr Vargas Evans and he uses [facility]. He owns no dme, no history of hhc but has had rehab in [facility]. Await pt/ot his plan is return home. cm/sw to follow. Electronically signed by Shayna Soares RN on 8/2/2022 at 1:14 PM

## 2022-08-02 NOTE — ED NOTES
Patient A&Ox4 states \"the lights go out even when I don't drink beer, people just over react. \"; pt sitting in bed on phone vitals stable call bell in reach; c-collar on; will monitor     Alvaro Baird RN  08/02/22 0104

## 2022-08-02 NOTE — ED NOTES
Gerard Eduardo NP regarding patient home medications; she will look and order them      Brett Burden RN  08/02/22 1582

## 2022-08-02 NOTE — PROGRESS NOTES
[address], [address]                                                  Patient Name: Megan Sevilla    MRN: [id number]    : 1965    Room: [address] [place] [address]          Evaluating OT: Rohit Lipoma OTR/L; [id number]       Referring Provider: PREM Nagy    Specific Provider Orders/Date: OT Eval and Treat 22      Diagnosis: Syncope; Stroke-like symptoms. Pt c/o worsening L sided weakness from prior CVA PTA. Surgery: None this admission     Pertinent Medical History:  has no past medical history on file.      Recommended Adaptive Equipment: TBD     Precautions:  Fall Risk, L-side weakness (history of prior CVA)     Assessment of current deficits    [x] Functional mobility  [x]ADLs  [x] Strength               []Cognition    [x] Functional transfers   [x] IADLs         [x] Safety Awareness   [x]Endurance    [] Fine Coordination              [x] Balance      [] Vision/perception   []Sensation     []Gross Motor Coordination  [] ROM  [] Delirium                   [] Motor Control     OT PLAN OF CARE   OT POC based on physician orders, patient diagnosis and results of clinical assessment    Frequency/Duration 1-3 days/wk for 2 weeks PRN   Specific OT Treatment Interventions to include:   * Instruction/training on adapted ADL techniques and AE recommendations to increase functional independence within precautions       * Training on energy conservation strategies, correct breathing pattern and techniques to improve independence/tolerance for self-care routine  * Functional transfer/mobility training/DME recommendations for increased independence, safety, and fall prevention  * Patient/Family education to increase follow through with safety techniques and functional independence  * Recommendation of environmental modifications for increased safety with functional transfers/mobility and ADLs  * Therapeutic exercise to improve motor endurance, ROM, and functional strength for ADLs/functional transfers  * Therapeutic activities to facilitate/challenge dynamic balance, stand tolerance for increased safety and independence with ADLs  * Positioning to improve skin integrity, interaction with environment and functional independence    Modified [place] Scale   Score     Description  0             No symptoms  1             No significant disability despite symptoms  2             Slight disability; able to look after own affairs  3             Moderate disability; able to ambulate without assist/ requires assist with ADLs  4             Moderate/Severe disability;requires assist to ambulate/assist with ADLs  5             Severe disability;bedridden/incontinent   6               Score:   4    Home Living: Pt lives with wife, granddaughter, son & sons significant other in 1 story home with ramp entry. Laundry in basement. Bathroom setup: Tub/shower with tub bench & grab bars   Equipment owned: Tub bemnch, walking stick    Prior Level of Function: IND with ADLs , IND with IADLs; engaged in functional mobility with use of  walking stick PRN  Driving: No  Occupation: None reported    Pain Level: Pt c/o reginaldo knee pain 6/10; therapist provided repositioning techniques  Cognition: A&O: /; Follows 2 step directions   Memory:  Good   Sequencing:  Fair   Problem solving:  Fair   Judgement/safety:  Fair     Functional Assessment:  AM-PAC Daily Activity Raw Score:    Initial Eval Status  Date: 22 Treatment Status  Date: STGs = LTGs  Time frame: 10-14 days   Feeding Setup  Independent    Grooming Stand by Assist   Independent    UB Dressing Stand by Assist   Independent    LB Dressing Minimal Assist   Pt sat EOB to don/doff socks. Independent   Bathing Stand by Assist  Independent   Toileting Minimal Assist   Pt stood at commode to void self of urine.   Independent   Bed Mobility  Supine to sit: NT  Sit to supine: Stand by Assist   Supine to sit: Independent   Sit to supine: Independent    Functional Transfers Sit to stand:SBA   Stand to sit:SBA  Stand pivot: SBA  Commode: SBA  Sit to stand:Independent   Stand to sit: Independent  Stand pivot: Independent  Commode: Independent    Functional Mobility SBA  No use of AD to<>from bathroom. Independent    Balance Sitting:     Static - Modified [place]     Dynamic - Supervision  Standing: SBA  Sitting:     Static: Independent     Dynamic: Independent  Standing: Independent   Activity Tolerance Fair  Good   Visual/  Perceptual Glasses: Yes  Visual tracking & perception appear WFL. Safety Fair  Good  during ADL completion     Hand Dominance Left   AROM (PROM) Strength Additional Info:  Goal:   RUE  WFL 4-/5 grossly tested good  and wfl FMC/dexterity noted during ADL tasks   Improve overall RUE strength WFL for participation in functional tasks       LUE WFL 3+/5 grossly tested Good  and wfl FMC/dexterity noted during ADL tasks   Improve overall LUE strength WFL for participation in functional tasks       Hearing: [email or web address]  Sensation:  No c/o numbness or tingling BUE  Tone: WFL BUE  Edema: Unremarkable    Comment: Cleared by RN to see pt. Upon arrival patient seated EOB and agreeable to OT session. At end of session, patient supine in bed with call light and phone within reach, all lines and tubes intact. Overall patient demonstrated decreased independence and safety during completion of ADL/functional transfer/mobility tasks. Therapist facilitated ADL tasks, functional transfers, functional mobility, bed mobility to address safety awareness, implementation of fall prevention strategies, & functional engagement throughout daily activities. Pt with no c/o pain or lightheadedness throughout duration of session.  Pt would benefit from continued skilled OT to increase safety and independence with completion of ADL/IADL tasks for functional independence and quality of life. Rehab Potential: Good for established goals     LTG: maximize independence with ADLs to return to PLOF    Patient and/or family were instructed on functional diagnosis, prognosis/goals and OT plan of care. Demonstrated fair understanding. Eval Complexity: Low  History: Expanded chart review of medical records and additional review of physical, cognitive, or psychosocial history related to current functional performance  Exam: 3+ performance deficits  Assistance/Modification: Min/mod assistance or modifications required to perform tasks. May have comorbidities that affect occupational performance. Evaluation time includes thorough review of current medical information, gathering information on past medical & social history & PLOF, completion of standardized testing, informal observation of tasks, consultation with other medical professions/disciplines, assessment of data & development of POC/goals. Time In: 10:50a  Time Out: 11:05a  Total Treatment Time: 0 minutes    Min Units   OT Eval Low 41674  x     OT Eval Medium 75044      OT Eval High 48624      OT Re-Eval [id number]       Therapeutic Ex 14589       Therapeutic Activities 88424       ADL/Self Care 07544       Orthotic Management 02136       Manual 20251     Neuro Re-Ed 44777       Non-Billable Time          Evaluation Time additionally includes thorough review of current medical information, gathering information on past medical history/social history and prior level of function, interpretation of standardized testing/informal observation of tasks, assessment of data and development of plan of care and goals.             Cj Duke OTR/L; [id number]

## 2022-08-02 NOTE — ED NOTES
Patient requesting c collar off; per physician now that patient is awake alert oriented not impaired collar may come off     Jelani Carter RN  08/02/22 1097

## 2022-08-02 NOTE — PROGRESS NOTES
Physical Therapy    Initial Assessment     Name: Clarisse Wells  : 1965  MRN: [id number]      Date of Service: 2022    Evaluating PT: Mansoor Jang PT, DPT [id number]      Room #:  4133/0972-M  Diagnosis:  Syncope and collapse [R55]  Stroke-like symptoms [R29.90]  Injury of head, initial encounter [T53.17HX]  Acute alcoholic intoxication without complication ([address] [F37.432]  PMHx/PSHx:  No Past Medical History documented  Precautions:  Fall Risk, Alarm      SUBJECTIVE:    Pt lives with wife and family in a single story home with ramp to enter. Pt bed and bath all on first floor. Pt has basement with 10 stairs and 1 rail down. Pt ambulated with walking stick as needed prior to admission. OBJECTIVE:   Initial Evaluation  Date: 22 Treatment Date: Short Term/ Long Term   Goals   AM-PAC 6 Clicks      Was pt agreeable to Eval/treatment? Yes     Does pt have pain? No current complaints of pain     Bed Mobility  Rolling: NT  Supine to sit: SBA  Sit to supine: SBA  Scooting: SBA  Rolling: Independent   Supine to sit: Independent   Sit to supine: Independent   Scooting: Independent    Transfers Sit to stand: SBA  Stand to sit: SBA  Stand pivot: SBA with no AD  Sit to stand: Independent   Stand to sit: Independent   Stand pivot: Independent    Ambulation   75 feet with SBA with no AD  200 feet Independent    Stair negotiation: ascended and descended NT  10 step(s) with 1 rail(s) Mod Independent    ROM BUE: See OT note  BLE: WFL     Strength BUE: See OT note  BLE: grossly 4/5     Balance Sitting EOB: SBA  Dynamic Standing: SBA with no AD  Sitting EOB: Independent   Dynamic Standing: Independent      Pt is A & O x: 4 grossly to person, place, time and situation. Sensation: Pt denies any numbness or tingling  Edema: Unremarkable. Patient education  Pt educated on PT role in acute care.     Patient response to education:   Pt verbalized understanding Pt demonstrated skill Pt requires further education Treatment Recommendations:     [] Strengthening to improve independence with functional mobility   [] ROM to improve independence with functional mobility   [x] Balance Training to improve static/dynamic balance and to reduce fall risk  [x] Endurance Training to improve activity tolerance during functional mobility   [] Transfer Training to improve safety and independence with all functional transfers   [x] Gait Training to improve gait mechanics, endurance and assess need for appropriate assistive device  [x] Stair Training in preparation for safe discharge home and/or into the community   [] Positioning to prevent skin breakdown and contractures  [x] Safety and Education Training   [x] Patient/Caregiver Education   [] HEP  [] Other     PT long term treatment goals are located in above grid    Frequency of treatments: 2-5x/week x 2-3 days. Time in  1045  Time out  1055    Total Treatment Time  0 minutes     Evaluation Time includes thorough review of current medical information, gathering information on past medical history/social history and prior level of function, completion of standardized testing/informal observation of tasks, assessment of data and education on plan of care and goals.     CPT codes:  [x] Low Complexity PT evaluation 82940  [] Moderate Complexity PT evaluation 71271  [] High Complexity PT evaluation 44525  [] PT Re-evaluation 40870  [] Gait training 38954 0 minutes  [] Manual therapy 60244 0 minutes  [] Therapeutic activities 33994 0 minutes  [] Therapeutic exercises 92126 0 minutes  [] Neuromuscular reeducation 65617 0 minutes     Jose Roberto Lord, PT, DPT  [id number]      Maicol White, SPT

## 2022-08-02 NOTE — ED NOTES
Taken to MRI at this time in stable condition     Regina Barros, JAYRO  08/02/22 [address], RN  08/02/22 6170

## 2022-08-02 NOTE — H&P
by oral route. rivaroxaban (XARELTO) 20 MG TABS tablet, Xarelto 20 mg tablet  Take 1 tablet every day by oral route. telmisartan (MICARDIS) 20 MG tablet, TAKE ONE TABLET BY MOUTH DAILY  divalproex (DEPAKOTE) 125 MG DR tablet, TAKE 2 TABLETS (250MG) BY MOUTH IN THE MORNING AND TAKE 3 TABLETS (375MG) AT BEDTIME  pantoprazole (PROTONIX) 40 MG tablet, TAKE ONE TABLET BY MOUTH EVERY DAY BEFORE BREAKFAST  furosemide (LASIX) 20 MG tablet, TAKE ONE TABLET BY MOUTH EVERY DAY  Aspirin Buf,CaCarb-MgCarb-MgO, 81 MG TABS, Take 81 mg by mouth daily (Patient not taking: No sig reported)  Thiamine HCl (B-1) 100 MG TABS, TAKE ONE TABLET BY MOUTH EVERY DAY  folic acid (FOLVITE) 1 MG tablet, Take 1 mg by mouth daily (Patient not taking: Reported on 8/2/2022)  ALPRAZolam (XANAX) 0.5 MG tablet, Take 0.5 mg by mouth 3 times daily as needed. Note that the patient's home medications were reviewed and the above list is accurate to the best of my knowledge at the time of the exam.    Allergies:    Patient has no known allergies. Social History:    reports that he has quit smoking. He smoked an average of 3 packs per day. He quit smokeless tobacco use about 16 years ago. He reports current alcohol use. He reports that he does not use drugs. Family History:   family history includes Heart Disease in his father. PHYSICAL EXAM:    Vitals:  BP (!) 131/96   Pulse 66   Temp 97 °F (36.1 °C) (Temporal)   Resp 15   Ht 5' 10\" (1.778 m)   Wt 250 lb (113.4 kg)   SpO2 97%   BMI 35.87 kg/m²       General appearance: NAD, conversant, pleasant  Eyes: Sclerae anicteric, PERRLA  HEENT: AT/NC, MMM  Neck: FROM, supple, no thyromegaly  Lymph: No cervical / supraclavicular lymphadenopathy  Lungs: Clear to auscultation, WOB normal  CV: RRR, no MRGs, no lower extremity edema  Abdomen: Soft, non-tender; no masses or HSM, +BS  Extremities: FROM without synovitis. No clubbing or cyanosis of the hands.   Skin: no rash, induration, lesions, or ulcers  Psych: Calm and cooperative. Normal judgement and insight. Normal mood and affect. Neuro: Alert and interactive, face symmetric, speech fluent. LABS:  All labs reviewed. Of note:  CBC with Differential:    Lab Results   Component Value Date/Time    WBC 4.4 08/02/2022 05:29 AM    RBC 4.11 08/02/2022 05:29 AM    HGB 11.9 08/02/2022 05:29 AM    HCT 36.0 08/02/2022 05:29 AM     08/02/2022 05:29 AM    MCV 87.6 08/02/2022 05:29 AM    MCH 29.0 08/02/2022 05:29 AM    MCHC 33.1 08/02/2022 05:29 AM    RDW 13.4 08/02/2022 05:29 AM    SEGSPCT 55 08/09/2011 02:43 AM    LYMPHOPCT 30.7 08/02/2022 05:29 AM    MONOPCT 8.8 08/02/2022 05:29 AM    BASOPCT 0.7 08/02/2022 05:29 AM    MONOSABS 0.39 08/02/2022 05:29 AM    LYMPHSABS 1.36 08/02/2022 05:29 AM    EOSABS 0.23 08/02/2022 05:29 AM    BASOSABS 0.03 08/02/2022 05:29 AM     CMP:    Lab Results   Component Value Date/Time     08/02/2022 05:29 AM    K 3.9 08/02/2022 05:29 AM     08/02/2022 05:29 AM    CO2 22 08/02/2022 05:29 AM    BUN 11 08/02/2022 05:29 AM    CREATININE 0.8 08/02/2022 05:29 AM    GFRAA >60 08/02/2022 05:29 AM    LABGLOM >60 08/02/2022 05:29 AM    GLUCOSE 83 08/02/2022 05:29 AM    GLUCOSE 95 08/09/2011 02:43 AM    PROT 7.2 08/02/2022 05:29 AM    LABALBU 4.3 08/02/2022 05:29 AM    LABALBU 4.5 08/09/2011 02:43 AM    CALCIUM 8.9 08/02/2022 05:29 AM    BILITOT 0.3 08/02/2022 05:29 AM    ALKPHOS 57 08/02/2022 05:29 AM    AST 36 08/02/2022 05:29 AM    ALT 39 08/02/2022 05:29 AM       Imaging:  MRI brain:  Mild right parietal lobe encephalomalacia    US Carotid:  No hemodynamically significant stenosis    EKG:  I've personally reviewed the patient's EKG:  SB    Telemetry:  I've personally reviewed the patient's telemetry:  SB    ASSESSMENT/PLAN:  Principal Problem:    Stroke-like symptoms  Active Problems:    Syncope  Resolved Problems:    * No resolved hospital problems.  *    57 year old male with a history of CVA, aflutter, presents to the ED with fall and is admitted to telemetry unit with    Stroke like symptoms  -MRI brain without contrast\ultrasound carotid artery-unremarkable  -Aspirin 81 mg Lipitor 40 mg daily  -Neurology following -no further plans, Xarelto has been resumed along with antihypertensives  -Check lipid panel and hemoglobin A1c needs tight control of risk factors. -Monitor blood pressure-adjust medications as needed.  -Discussed risk factor modification.  -Patient sits out in the sun and drinks 6-10 beers in the afternoon-explained to him that this could lead to profound dehydration and syncopal episodes, he voices understanding  -Continue Xarelto    Syncope-  Patient does have a loop recorder in place for previous history of syncope  Orthostatic bp's  IV hydration  Luis hose  Educated position changes   Echocardiogram is unremarkable    Medication for other comorbidities continue as appropriate dose adjustment as necessary. DVT prophylaxis - xarelto  PT OT  Discharge planning  Case discussed with attending and agreed upon plan of care. Code status: Full  Requires inpatient level of care  DOMINIC Walker CNP    1:19 PM  8/2/2022     Above note edited to reflect my thoughts     I personally saw, examined and provided care for the patient. Radiographs, labs and medication list were reviewed by me independently. The case was discussed in detail and plans for care were established. Review of Rossy ELIAS CNP, documentation was conducted and revisions were made as appropriate directly by me. I agree with the above documented exam, problem list, and plan of care.      Ishan Giordano MD  5:31 PM  8/2/2022

## 2022-08-04 LAB — URINE CULTURE, ROUTINE: NORMAL

## 2022-08-15 NOTE — PROGRESS NOTES
Physician Progress Note      PATIENT:               Richa Oliver  CSN #:                  [id number]  :                       1965  ADMIT DATE:       2022 9:11 PM  [address] DATE:        2022 6:52 PM  RESPONDING  PROVIDER #:        Ashlee Hagen LEARN          QUERY TEXT:    Pt admitted with syncope and a fall. Pt noted to have hemiplegia following   history of CVA,  alcohol intoxication  and history of aflutter If possible,   please document in progress notes and discharge summary the etiology of   syncope and fall: The medical record reflects the following:  Risk Factors: Syncope, Fall  Clinical Indicators: Per Neuro consult mild left hemiparesis with some   embellishing symptoms- suspect he does have some fluctuations when tired,   dehydrated, drinking EtOH; Stroke like symptoms MRI brain without   contrastultrasound carotid artery-unremarkable Patient sits out in the sun and   drinks 6-10 beers in the afternoon-explained to him that this could lead to   profound dehydration and syncopal episodes  Treatment: Neuro consult, Imaging, pt education    Thank you  [employer] BSN, RN, CCDS  Clinical Documentation Improvement  Options provided:  -- syncope and fall due to alcohol intoxication  -- syncope and fall due to hemiplegia from prior stroke  -- syncope and fall due to a flutter  -- syncope and fall due to, Please specify. -- Other - I will add my own diagnosis  -- Disagree - Not applicable / Not valid  -- Disagree - Clinically unable to determine / Unknown  -- Refer to Clinical Documentation Reviewer    PROVIDER RESPONSE TEXT:    This patient has syncope and fall due to alcohol intoxication.     Query created by: Milind Henriquez on 3126 39:93 AM      Electronically signed by:  Jayesh Barone 8/15/2022 5:52 AM